# Patient Record
Sex: FEMALE | Race: WHITE | NOT HISPANIC OR LATINO | Employment: OTHER | URBAN - METROPOLITAN AREA
[De-identification: names, ages, dates, MRNs, and addresses within clinical notes are randomized per-mention and may not be internally consistent; named-entity substitution may affect disease eponyms.]

---

## 2018-07-25 DIAGNOSIS — K21.9 GERD (GASTROESOPHAGEAL REFLUX DISEASE): Primary | ICD-10-CM

## 2018-07-26 RX ORDER — PANTOPRAZOLE SODIUM 40 MG/1
TABLET, DELAYED RELEASE ORAL
Qty: 90 TABLET | Refills: 5 | Status: SHIPPED | OUTPATIENT
Start: 2018-07-26 | End: 2019-10-20 | Stop reason: SDUPTHER

## 2019-06-02 DIAGNOSIS — I10 ESSENTIAL HYPERTENSION, BENIGN: Primary | ICD-10-CM

## 2019-06-02 RX ORDER — AMLODIPINE BESYLATE 5 MG/1
TABLET ORAL
Qty: 180 TABLET | Refills: 3 | Status: SHIPPED | OUTPATIENT
Start: 2019-06-02

## 2019-10-20 DIAGNOSIS — K21.9 GERD (GASTROESOPHAGEAL REFLUX DISEASE): ICD-10-CM

## 2019-10-21 RX ORDER — PANTOPRAZOLE SODIUM 40 MG/1
TABLET, DELAYED RELEASE ORAL
Qty: 90 TABLET | Refills: 4 | Status: SHIPPED | OUTPATIENT
Start: 2019-10-21

## 2020-05-28 ENCOUNTER — TELEPHONE (OUTPATIENT)
Dept: CARDIOLOGY CLINIC | Facility: CLINIC | Age: 85
End: 2020-05-28

## 2021-02-11 DIAGNOSIS — Z23 ENCOUNTER FOR IMMUNIZATION: ICD-10-CM

## 2021-03-19 RX ORDER — FUROSEMIDE 20 MG/1
20 TABLET ORAL 2 TIMES DAILY
COMMUNITY

## 2021-03-19 RX ORDER — METOPROLOL TARTRATE 50 MG/1
50 TABLET, FILM COATED ORAL EVERY 12 HOURS SCHEDULED
COMMUNITY

## 2021-03-19 RX ORDER — WARFARIN SODIUM 4 MG/1
TABLET ORAL SEE ADMIN INSTRUCTIONS
COMMUNITY

## 2021-03-19 RX ORDER — LATANOPROST 50 UG/ML
1 SOLUTION/ DROPS OPHTHALMIC
COMMUNITY

## 2021-03-19 RX ORDER — ACETAMINOPHEN 325 MG/1
650 TABLET ORAL EVERY 6 HOURS PRN
COMMUNITY

## 2021-03-19 RX ORDER — DORZOLAMIDE HYDROCHLORIDE AND TIMOLOL MALEATE 20; 5 MG/ML; MG/ML
1 SOLUTION/ DROPS OPHTHALMIC 2 TIMES DAILY
COMMUNITY

## 2021-03-19 RX ORDER — IRBESARTAN 150 MG/1
150 TABLET ORAL
COMMUNITY

## 2021-03-19 NOTE — PRE-PROCEDURE INSTRUCTIONS
Pre-Surgery Instructions:   Medication Instructions    acetaminophen (TYLENOL) 325 mg tablet Instructed patient per Anesthesia Guidelines   amLODIPine (NORVASC) 5 mg tablet Instructed patient per Anesthesia Guidelines   dorzolamide-timolol (COSOPT) 22 3-6 8 MG/ML ophthalmic solution Instructed patient per Anesthesia Guidelines   furosemide (LASIX) 20 mg tablet Instructed patient per Anesthesia Guidelines   irbesartan (AVAPRO) 150 mg tablet Instructed patient per Anesthesia Guidelines   latanoprost (XALATAN) 0 005 % ophthalmic solution Instructed patient per Anesthesia Guidelines   metoprolol tartrate (LOPRESSOR) 50 mg tablet Instructed patient per Anesthesia Guidelines   pantoprazole (PROTONIX) 40 mg tablet Instructed patient per Anesthesia Guidelines   VITAMIN D PO Instructed patient per Anesthesia Guidelines   warfarin (COUMADIN) 4 mg tablet Patient was instructed by Physician and understands  Pt instructed to take protonix, metoprolol, norvasc the morning of surgery with a small sip of water and use eye drops  [facility] preop instructions reviewed with pt  Pt will get dial antibacterial soap

## 2021-03-24 ENCOUNTER — ANESTHESIA EVENT (OUTPATIENT)
Dept: PERIOP | Facility: HOSPITAL | Age: 86
End: 2021-03-24
Payer: COMMERCIAL

## 2021-03-24 NOTE — ANESTHESIA PREPROCEDURE EVALUATION
Procedure:  UPPER LID MULLERECTOMY (Right Face)    Relevant Problems   ANESTHESIA   (+) PONV (postoperative nausea and vomiting)      CARDIO   (+) Atrial fibrillation (HCC)   (+) CHF (congestive heart failure) (HCC)   (+) HTN (hypertension)      GI/HEPATIC   (+) GERD (gastroesophageal reflux disease)        Physical Exam    Airway    Mallampati score: II  TM Distance: <3 FB  Neck ROM: full     Dental   upper dentures and lower dentures,     Cardiovascular  Rhythm: irregular,     Pulmonary  Pulmonary exam normal     Other Findings        Anesthesia Plan  ASA Score- 3     Anesthesia Type- IV sedation with anesthesia with ASA Monitors  Additional Monitors:   Airway Plan:           Plan Factors-Exercise tolerance (METS): >4 METS  Chart reviewed  EKG reviewed  Existing labs reviewed  Patient is not a current smoker  Patient not instructed to abstain from smoking on day of procedure  Patient did not smoke on day of surgery  Induction- intravenous  Postoperative Plan-     Informed Consent- Anesthetic plan and risks discussed with patient  I personally reviewed this patient with the CRNA  Discussed and agreed on the Anesthesia Plan with the CRNA  Lali Smith

## 2021-03-25 ENCOUNTER — HOSPITAL ENCOUNTER (OUTPATIENT)
Facility: HOSPITAL | Age: 86
Setting detail: OUTPATIENT SURGERY
Discharge: HOME | End: 2021-03-25
Attending: OPHTHALMOLOGY | Admitting: OPHTHALMOLOGY
Payer: COMMERCIAL

## 2021-03-25 ENCOUNTER — ANESTHESIA (OUTPATIENT)
Dept: PERIOP | Facility: HOSPITAL | Age: 86
End: 2021-03-25
Payer: COMMERCIAL

## 2021-03-25 VITALS
SYSTOLIC BLOOD PRESSURE: 132 MMHG | RESPIRATION RATE: 16 BRPM | HEIGHT: 62 IN | TEMPERATURE: 96.8 F | BODY MASS INDEX: 29.44 KG/M2 | WEIGHT: 160 LBS | DIASTOLIC BLOOD PRESSURE: 85 MMHG | HEART RATE: 58 BPM | OXYGEN SATURATION: 96 %

## 2021-03-25 PROBLEM — I50.9 CHF (CONGESTIVE HEART FAILURE) (HCC): Status: ACTIVE | Noted: 2021-03-25

## 2021-03-25 PROBLEM — I10 HTN (HYPERTENSION): Status: ACTIVE | Noted: 2021-03-25

## 2021-03-25 PROBLEM — I48.91 ATRIAL FIBRILLATION (HCC): Status: ACTIVE | Noted: 2021-03-25

## 2021-03-25 RX ORDER — ACETAMINOPHEN 325 MG/1
650 TABLET ORAL ONCE
Status: COMPLETED | OUTPATIENT
Start: 2021-03-25 | End: 2021-03-25

## 2021-03-25 RX ORDER — DEXAMETHASONE SODIUM PHOSPHATE 4 MG/ML
INJECTION, SOLUTION INTRA-ARTICULAR; INTRALESIONAL; INTRAMUSCULAR; INTRAVENOUS; SOFT TISSUE AS NEEDED
Status: DISCONTINUED | OUTPATIENT
Start: 2021-03-25 | End: 2021-03-25

## 2021-03-25 RX ORDER — PROPOFOL 10 MG/ML
INJECTION, EMULSION INTRAVENOUS CONTINUOUS PRN
Status: DISCONTINUED | OUTPATIENT
Start: 2021-03-25 | End: 2021-03-25

## 2021-03-25 RX ORDER — ONDANSETRON 2 MG/ML
4 INJECTION INTRAMUSCULAR; INTRAVENOUS ONCE AS NEEDED
Status: DISCONTINUED | OUTPATIENT
Start: 2021-03-25 | End: 2021-03-25 | Stop reason: HOSPADM

## 2021-03-25 RX ORDER — LIDOCAINE HYDROCHLORIDE AND EPINEPHRINE BITARTRATE 20; .01 MG/ML; MG/ML
INJECTION, SOLUTION SUBCUTANEOUS AS NEEDED
Status: DISCONTINUED | OUTPATIENT
Start: 2021-03-25 | End: 2021-03-25 | Stop reason: HOSPADM

## 2021-03-25 RX ORDER — SODIUM CHLORIDE, SODIUM LACTATE, POTASSIUM CHLORIDE, CALCIUM CHLORIDE 600; 310; 30; 20 MG/100ML; MG/100ML; MG/100ML; MG/100ML
125 INJECTION, SOLUTION INTRAVENOUS CONTINUOUS
Status: DISCONTINUED | OUTPATIENT
Start: 2021-03-25 | End: 2021-03-25 | Stop reason: HOSPADM

## 2021-03-25 RX ORDER — PROMETHAZINE HYDROCHLORIDE 25 MG/ML
12.5 INJECTION, SOLUTION INTRAMUSCULAR; INTRAVENOUS ONCE AS NEEDED
Status: DISCONTINUED | OUTPATIENT
Start: 2021-03-25 | End: 2021-03-25 | Stop reason: HOSPADM

## 2021-03-25 RX ORDER — TETRACAINE HYDROCHLORIDE 5 MG/ML
SOLUTION OPHTHALMIC AS NEEDED
Status: DISCONTINUED | OUTPATIENT
Start: 2021-03-25 | End: 2021-03-25 | Stop reason: HOSPADM

## 2021-03-25 RX ORDER — FENTANYL CITRATE 50 UG/ML
INJECTION, SOLUTION INTRAMUSCULAR; INTRAVENOUS AS NEEDED
Status: DISCONTINUED | OUTPATIENT
Start: 2021-03-25 | End: 2021-03-25

## 2021-03-25 RX ORDER — FENTANYL CITRATE/PF 50 MCG/ML
25 SYRINGE (ML) INJECTION
Status: DISCONTINUED | OUTPATIENT
Start: 2021-03-25 | End: 2021-03-25 | Stop reason: HOSPADM

## 2021-03-25 RX ORDER — ONDANSETRON 2 MG/ML
INJECTION INTRAMUSCULAR; INTRAVENOUS AS NEEDED
Status: DISCONTINUED | OUTPATIENT
Start: 2021-03-25 | End: 2021-03-25

## 2021-03-25 RX ORDER — LIDOCAINE HYDROCHLORIDE 10 MG/ML
INJECTION, SOLUTION EPIDURAL; INFILTRATION; INTRACAUDAL; PERINEURAL AS NEEDED
Status: DISCONTINUED | OUTPATIENT
Start: 2021-03-25 | End: 2021-03-25

## 2021-03-25 RX ORDER — BUPIVACAINE HYDROCHLORIDE 5 MG/ML
INJECTION, SOLUTION PERINEURAL AS NEEDED
Status: DISCONTINUED | OUTPATIENT
Start: 2021-03-25 | End: 2021-03-25 | Stop reason: HOSPADM

## 2021-03-25 RX ADMIN — PROPOFOL 10 MCG/KG/MIN: 10 INJECTION, EMULSION INTRAVENOUS at 13:43

## 2021-03-25 RX ADMIN — SODIUM CHLORIDE, SODIUM LACTATE, POTASSIUM CHLORIDE, AND CALCIUM CHLORIDE: .6; .31; .03; .02 INJECTION, SOLUTION INTRAVENOUS at 13:28

## 2021-03-25 RX ADMIN — ONDANSETRON HYDROCHLORIDE 4 MG: 2 INJECTION, SOLUTION INTRAMUSCULAR; INTRAVENOUS at 13:45

## 2021-03-25 RX ADMIN — FENTANYL CITRATE 25 MCG: 50 INJECTION, SOLUTION INTRAMUSCULAR; INTRAVENOUS at 13:46

## 2021-03-25 RX ADMIN — LIDOCAINE HYDROCHLORIDE 40 MG: 10 INJECTION, SOLUTION EPIDURAL; INFILTRATION; INTRACAUDAL; PERINEURAL at 13:43

## 2021-03-25 RX ADMIN — DEXAMETHASONE SODIUM PHOSPHATE 4 MG: 4 INJECTION, SOLUTION INTRAMUSCULAR; INTRAVENOUS at 13:44

## 2021-03-25 RX ADMIN — ACETAMINOPHEN 650 MG: 325 TABLET, FILM COATED ORAL at 15:05

## 2021-03-25 NOTE — INTERVAL H&P NOTE
H&P reviewed  After examining the patient I find no changes in the patients condition since the H&P had been written      Vitals:    03/25/21 1117   BP: 151/69   Pulse: 61   Resp: 20   Temp: 97 6 °F (36 4 °C)   SpO2: 98%

## 2021-03-25 NOTE — ANESTHESIA POSTPROCEDURE EVALUATION
Post-Op Assessment Note    CV Status:  Stable  Pain Score: 0    Pain management: adequate     Mental Status:  Alert and awake   Hydration Status:  Euvolemic   PONV Controlled:  Controlled   Airway Patency:  Patent      Post Op Vitals Reviewed: Yes      Staff: CRNA         No complications documented      /63 (03/25/21 1413)    Temp (!) 97 °F (36 1 °C) (03/25/21 1413)    Pulse 64 (03/25/21 1413)   Resp   12   SpO2 97 % (03/25/21 1413)

## 2021-03-25 NOTE — DISCHARGE INSTRUCTIONS
POST-OPERATIVE INSTRUCTIONS   EYELID SURGERY     Diet: While post-operative nausea is rare with this type of anesthesia, it is wise to start your diet by drinking clear liquids after surgery (e g , 7-Up, Gatorade, ginger ale, etc )  If clear liquids are tolerated well without nausea or vomiting, you may advance towards a regular diet  Avoid fatty foods (e g , milk, pizza, hamburgers, etc ) on the day of surgery or as long as nausea persists  Pain control: Many eyelid procedures only require Extra Strength Tylenol (acetaminophen) for postoperative pain control  For those patients with more extensive eyelid reconstruction, a prescription for a pain reliever is often given  Patients may choose to take the prescribed pain reliever OR Extra Strength Tylenol, BUT NOT both together  Unless specifically instructed, aspirin products such as Erica, Bufferin, Anacin, Excedrin, Advil, Nuprin, Motrin, Ibuprofen, etc , should be avoided for at least one week after surgery  Any other medications which you were taking prior to surgery, should be continued on their regular schedule, unless you were instructed otherwise  Activity: Keep your activity to a minimum for the first 24 to 48 hours after surgery  No heavy lifting, bending, or straining until after you have your post-op visit  You may shower 24 hours after surgery  Wash your hair in the shower with your back to the water  Wash your face gently with a washcloth  Try not to get the stitches wet until you are seen for your post-op appointment  Whenever lying down or sleeping, keep your head elevated on 2 or 3 pillows such that your head is always elevated above the level of your chest      Wound care: Apply a cold compress to the surgical site as much as possible for 48 hours following surgery (20 minutes on/20 minutes off while awake)  A folded washcloth soaked in ice-cold water and wrung out or a bag of frozen peas works well for this   Do not apply ice directly to the skin  Once the ice packs are discontinued, you can start using warm, moist compresses over the affected area for 20 minutes at a time, four times a day  This helps with swelling and can be continued until you first post-operative visit  Avoid placing a patch on the eye if possible  A patch is usually not necessary and may delay recognition of potential complications  A small tube of eye ointment is prescribed after surgery  This is to be gently applied to the stitches twice  A Q-tip works well for this  If the eyes feel irritated, the ointment or artificial tears (which can be bought over-the-counter at the pharmacy) are safe to use in the eye for lubrication as often as needed  This will likely result in blurred vision but will go away once the ointment is stopped  EXCEPTION: If a patch is placed over the eye by the surgeon, do NOT apply cold compresses or ointment  Leave the patch undisturbed  A physician will remove the patch at your first post-operative visit  Follow up: If your surgery was done on an outpatient basis, you should receive a phone call the day after surgery to check on your progress and to arrange for a post-operative clinic appointment  The first post-operative visit will be one to two weeks after surgery to check the incisions and remove sutures if necessary  A second post-operative visit six to eight weeks after surgery will assess the final surgical result  The following problems should be reported to the office as soon as possible:   1  Continuous, brisk bleeding  Please note that some oozing or drainage is common following a surgical procedure  Do not try to clean dried blood from the surgical site  This will likely only create more bleeding  2  Temperature (fever) over 101?     3  Excessive pain at surgical site not relieved by the pain medication, especially if associated with protrusion of the eyeball  4  Sudden loss of vision   Please note that some blurriness is expected after surgery due to the ointment used around the eyes  All patients should be able to check their vision even with eyelid swelling  5  Double vision     If a problem should arise or you have a question, please call:     · Office [phone]    I hope that your surgery experience with us is a positive one  Please contact my office with any questions       Jaycee Jaimes MD, MPhil

## 2021-03-25 NOTE — OP NOTE
OPERATIVE REPORT  PATIENT NAME: Rich Faye    :  1935  MRN: [id number]  Pt Location:  OR ROOM 07    SURGERY DATE: 3/25/2021    Surgeon(s) and Role:     Chris Diaz MD - Primary    Preop Diagnosis:  Unspecified ptosis of right eyelid [H02 401]    Post-Op Diagnosis Codes:     * Unspecified ptosis of right eyelid [H02 401]    Procedure(s) (LRB):  UPPER LID MULLERECTOMY (Right)    Specimen(s):  * No specimens in log *    Estimated Blood Loss:   0 mL    Drains:  * No LDAs found *    Anesthesia Type:   IV Sedation with Anesthesia    Operative Indications:  Unspecified ptosis of right eyelid [H02 401]       Operative Findings:  RUL ptosis    Complications:   None    Procedure and Technique: This is a patient who presented to clinic with visually significant right upper lid ptosis  The patient noted that the heavy upper eyelid was interfering with her activities of daily living  The risks, benefits, and alternatives of surgery were discussed at length with the patient  All questions were answered  Patient signed informed consent  The patient was taken to the operating room and placed on the table in the supine position where anesthesia was induced  2% lidocaine with epinephrine was injected at the surgical site and the patient was prepped and draped in the usual manner for ocular facial surgery  A 4 0 silk traction suture was placed through the right upper lid  The lid was everted over a Desmarres retractor  Further local anesthetic was injected subconjunctivally  4 mm was marked from the superior border of tarsus onto the conjunctiva  A 4 0 silk traction suture was placed through the conjunctiva and blakely's muscle at the marked points  The conjunctiva and Blakely's muscle was tented up and placed in a Putterman ptosis clamp    A 5 0 fast-absorbing plain gut suture was passed full-thickness through the upper lid from the skin to conjunctiva at the lateral aspect and passed in a running horizontal mattress fashion from lateral to medial and then medial to lateral where it was then exited from the conjunctiva to the skin and tied off  The tissue in the ptosis clamp was then excised with a 15 Bard-Sher blade  The traction sutures were removed  The patient was then awakened and taken from the operating room in good condition having tolerated the procedure well  I was present for the entire procedure      Patient Disposition:  PACU     SIGNATURE: Yordan Busch MD  DATE: March 25, 2021  TIME: 2:17 PM

## (undated) DEVICE — SPONGE 4 X 4 XRAY 16 PLY STRL LF RFD

## (undated) DEVICE — NEEDLE 30 G X 1/2

## (undated) DEVICE — CABLE BIPOLAR DISP MEGADYNE

## (undated) DEVICE — OCCLUSIVE GAUZE STRIP,3% BISMUTH TRIBROMOPHENATE IN PETROLATUM BLEND: Brand: XEROFORM

## (undated) DEVICE — LIGHT GLOVE GREEN

## (undated) DEVICE — STERILE POLYISOPRENE POWDER-FREE SURGICAL GLOVES: Brand: PROTEXIS

## (undated) DEVICE — SUT SILK 4-0 G-3 789G

## (undated) DEVICE — 1820 FOAM BLOCK NEEDLE COUNTER: Brand: DEVON

## (undated) DEVICE — BASIC PACK: Brand: CONVERTORS

## (undated) DEVICE — PAD GROUNDING ADULT

## (undated) DEVICE — GLOVE SRG LF STRL BGL SKNSNS 7 PF

## (undated) DEVICE — SKIN MARKER DUAL TIP WITH RULER CAP, FLEXIBLE RULER AND LABELS: Brand: DEVON

## (undated) DEVICE — GAUZE SPONGES,16 PLY: Brand: CURITY

## (undated) DEVICE — SURGIFOAM 7 X 12 SPONGE ABS

## (undated) DEVICE — NEEDLE BLUNT 18 G X 1 1/2IN

## (undated) DEVICE — UNDERGLOVE PROTEXIS  BLUE SZ 7

## (undated) DEVICE — INTENDED FOR TISSUE SEPARATION, AND OTHER PROCEDURES THAT REQUIRE A SHARP SURGICAL BLADE TO PUNCTURE OR CUT.: Brand: BARD-PARKER SAFETY BLADES SIZE 15, STERILE

## (undated) DEVICE — NEEDLE 25G X 5/8 SAFETY

## (undated) DEVICE — COTTON TIP APPLICATORS: Brand: DEROYAL

## (undated) DEVICE — WIPES INSTRUMENT EYE DRAIN

## (undated) DEVICE — TIBURON SPLIT SHEET: Brand: CONVERTORS

## (undated) DEVICE — SUT PLAIN 5-0 PC-1 18 IN 1915G

## (undated) DEVICE — SYRINGE 10ML LL CONTROL TOP